# Patient Record
Sex: MALE | Race: WHITE | Employment: FULL TIME | URBAN - METROPOLITAN AREA
[De-identification: names, ages, dates, MRNs, and addresses within clinical notes are randomized per-mention and may not be internally consistent; named-entity substitution may affect disease eponyms.]

---

## 2025-05-16 ENCOUNTER — HOSPITAL ENCOUNTER (INPATIENT)
Age: 45
LOS: 3 days | Discharge: HOME | DRG: 897 | End: 2025-05-20
Admitting: PSYCHIATRY & NEUROLOGY
Payer: COMMERCIAL

## 2025-05-16 DIAGNOSIS — R45.851 SUICIDAL IDEATION: Primary | ICD-10-CM

## 2025-05-16 DIAGNOSIS — R45.851 DEPRESSION WITH SUICIDAL IDEATION: ICD-10-CM

## 2025-05-16 DIAGNOSIS — F32.A DEPRESSION WITH SUICIDAL IDEATION: ICD-10-CM

## 2025-05-16 LAB
AMPHET UR QL SCN: NORMAL
BARBITURATES UR QL SCN: NORMAL
BENZODIAZ UR QL SCN: NORMAL
BILIRUB UR QL STRIP: NEGATIVE
CANNABINOIDS UR QL SCN: NORMAL
CLARITY UR: CLEAR
COCAINE UR QL SCN: NORMAL
COLOR UR: YELLOW
DRUG SCREEN COMMENT UR-IMP: NORMAL
FENTANYL SCREEN, URINE: NORMAL
GLUCOSE UR STRIP-MCNC: NEGATIVE MG/DL
HGB UR QL STRIP: NEGATIVE
KETONES UR STRIP-MCNC: NEGATIVE MG/DL
LEUKOCYTE ESTERASE UR QL STRIP: NEGATIVE
METHADONE UR QL SCN: NORMAL
NITRITE UR QL STRIP: NEGATIVE
OPIATES UR QL SCN: NORMAL
OXYCODONE UR QL SCN: NORMAL
PCP UR QL SCN: NORMAL
PH UR STRIP: 5.5 [PH] (ref 5–9)
PROPOXYPH UR QL SCN: NORMAL
PROT UR STRIP-MCNC: NEGATIVE MG/DL
SP GR UR STRIP: 1.01 (ref 1–1.03)
URINE REFLEX TO CULTURE: NORMAL
UROBILINOGEN UR STRIP-ACNC: 0.2 E.U./DL

## 2025-05-16 PROCEDURE — 80143 DRUG ASSAY ACETAMINOPHEN: CPT

## 2025-05-16 PROCEDURE — 80179 DRUG ASSAY SALICYLATE: CPT

## 2025-05-16 PROCEDURE — 80053 COMPREHEN METABOLIC PANEL: CPT

## 2025-05-16 PROCEDURE — 80061 LIPID PANEL: CPT

## 2025-05-16 PROCEDURE — 82550 ASSAY OF CK (CPK): CPT

## 2025-05-16 PROCEDURE — 82077 ASSAY SPEC XCP UR&BREATH IA: CPT

## 2025-05-16 PROCEDURE — 81003 URINALYSIS AUTO W/O SCOPE: CPT

## 2025-05-16 PROCEDURE — 99284 EMERGENCY DEPT VISIT MOD MDM: CPT

## 2025-05-16 PROCEDURE — 85025 COMPLETE CBC W/AUTO DIFF WBC: CPT

## 2025-05-16 PROCEDURE — 36415 COLL VENOUS BLD VENIPUNCTURE: CPT

## 2025-05-16 PROCEDURE — 83036 HEMOGLOBIN GLYCOSYLATED A1C: CPT

## 2025-05-16 PROCEDURE — 84443 ASSAY THYROID STIM HORMONE: CPT

## 2025-05-16 PROCEDURE — 80307 DRUG TEST PRSMV CHEM ANLYZR: CPT

## 2025-05-16 ASSESSMENT — PAIN SCALES - GENERAL: PAINLEVEL_OUTOF10: 0

## 2025-05-16 ASSESSMENT — PAIN - FUNCTIONAL ASSESSMENT: PAIN_FUNCTIONAL_ASSESSMENT: 0-10

## 2025-05-17 PROBLEM — F32.A DEPRESSION, UNSPECIFIED: Status: ACTIVE | Noted: 2025-05-17

## 2025-05-17 LAB
ALBUMIN SERPL-MCNC: 4.6 G/DL (ref 3.5–4.6)
ALP SERPL-CCNC: 83 U/L (ref 35–104)
ALT SERPL-CCNC: 66 U/L (ref 0–41)
ANION GAP SERPL CALCULATED.3IONS-SCNC: 13 MEQ/L (ref 9–15)
APAP SERPL-MCNC: <5 UG/ML (ref 10–30)
AST SERPL-CCNC: 60 U/L (ref 0–40)
BASOPHILS # BLD: 0 K/UL (ref 0–0.2)
BASOPHILS NFR BLD: 0.9 %
BILIRUB SERPL-MCNC: 0.3 MG/DL (ref 0.2–0.7)
BUN SERPL-MCNC: 6 MG/DL (ref 6–20)
CALCIUM SERPL-MCNC: 8.7 MG/DL (ref 8.5–9.9)
CHLORIDE SERPL-SCNC: 100 MEQ/L (ref 95–107)
CHOLEST SERPL-MCNC: 193 MG/DL (ref 0–199)
CK SERPL-CCNC: 90 U/L (ref 0–190)
CO2 SERPL-SCNC: 24 MEQ/L (ref 20–31)
CREAT SERPL-MCNC: 0.59 MG/DL (ref 0.7–1.2)
EOSINOPHIL # BLD: 0.5 K/UL (ref 0–0.7)
EOSINOPHIL NFR BLD: 10.6 %
ERYTHROCYTE [DISTWIDTH] IN BLOOD BY AUTOMATED COUNT: 11.3 % (ref 11.5–14.5)
ESTIMATED AVERAGE GLUCOSE: 120 MG/DL
ETHANOL PERCENT: 0.22 G/DL
ETHANOLAMINE SERPL-MCNC: 248 MG/DL (ref 0–0.08)
GLOBULIN SER CALC-MCNC: 3.3 G/DL (ref 2.3–3.5)
GLUCOSE SERPL-MCNC: 96 MG/DL (ref 70–99)
HBA1C MFR BLD: 5.8 % (ref 4–6)
HCT VFR BLD AUTO: 45.1 % (ref 42–52)
HDLC SERPL-MCNC: 95 MG/DL (ref 40–59)
HGB BLD-MCNC: 15.8 G/DL (ref 14–18)
LDLC SERPL CALC-MCNC: 85 MG/DL (ref 0–129)
LYMPHOCYTES # BLD: 1.7 K/UL (ref 1–4.8)
LYMPHOCYTES NFR BLD: 39.3 %
MCH RBC QN AUTO: 32.1 PG (ref 27–31.3)
MCHC RBC AUTO-ENTMCNC: 35 % (ref 33–37)
MCV RBC AUTO: 91.7 FL (ref 79–92.2)
MONOCYTES # BLD: 0.5 K/UL (ref 0.2–0.8)
MONOCYTES NFR BLD: 10.3 %
NEUTROPHILS # BLD: 1.7 K/UL (ref 1.4–6.5)
NEUTS SEG NFR BLD: 38.2 %
PLATELET # BLD AUTO: 225 K/UL (ref 130–400)
POTASSIUM SERPL-SCNC: 4.3 MEQ/L (ref 3.4–4.9)
PROT SERPL-MCNC: 7.9 G/DL (ref 6.3–8)
RBC # BLD AUTO: 4.92 M/UL (ref 4.7–6.1)
SALICYLATES SERPL-MCNC: <0.3 MG/DL (ref 15–30)
SODIUM SERPL-SCNC: 137 MEQ/L (ref 135–144)
TRIGL SERPL-MCNC: 64 MG/DL (ref 0–150)
TSH SERPL-MCNC: 3.27 UIU/ML (ref 0.44–3.86)
WBC # BLD AUTO: 4.4 K/UL (ref 4.8–10.8)

## 2025-05-17 PROCEDURE — 93005 ELECTROCARDIOGRAM TRACING: CPT | Performed by: STUDENT IN AN ORGANIZED HEALTH CARE EDUCATION/TRAINING PROGRAM

## 2025-05-17 PROCEDURE — GZHZZZZ GROUP PSYCHOTHERAPY: ICD-10-PCS | Performed by: PSYCHIATRY & NEUROLOGY

## 2025-05-17 PROCEDURE — 1240000000 HC EMOTIONAL WELLNESS R&B

## 2025-05-17 PROCEDURE — 6370000000 HC RX 637 (ALT 250 FOR IP): Performed by: PSYCHIATRY & NEUROLOGY

## 2025-05-17 RX ORDER — HYDROXYZINE PAMOATE 50 MG/1
50 CAPSULE ORAL EVERY 6 HOURS PRN
Status: DISCONTINUED | OUTPATIENT
Start: 2025-05-17 | End: 2025-05-20 | Stop reason: HOSPADM

## 2025-05-17 RX ORDER — HALOPERIDOL 5 MG/ML
5 INJECTION INTRAMUSCULAR EVERY 6 HOURS PRN
Status: DISCONTINUED | OUTPATIENT
Start: 2025-05-17 | End: 2025-05-20 | Stop reason: HOSPADM

## 2025-05-17 RX ORDER — TRAZODONE HYDROCHLORIDE 50 MG/1
50 TABLET ORAL NIGHTLY PRN
Status: DISCONTINUED | OUTPATIENT
Start: 2025-05-17 | End: 2025-05-20 | Stop reason: HOSPADM

## 2025-05-17 RX ORDER — AMLODIPINE BESYLATE 5 MG/1
5 TABLET ORAL DAILY
COMMUNITY
Start: 2025-03-28 | End: 2026-03-28

## 2025-05-17 RX ORDER — HALOPERIDOL 5 MG/1
5 TABLET ORAL EVERY 6 HOURS PRN
Status: DISCONTINUED | OUTPATIENT
Start: 2025-05-17 | End: 2025-05-20 | Stop reason: HOSPADM

## 2025-05-17 RX ORDER — MAGNESIUM HYDROXIDE/ALUMINUM HYDROXICE/SIMETHICONE 120; 1200; 1200 MG/30ML; MG/30ML; MG/30ML
30 SUSPENSION ORAL PRN
Status: DISCONTINUED | OUTPATIENT
Start: 2025-05-17 | End: 2025-05-20 | Stop reason: HOSPADM

## 2025-05-17 RX ORDER — PROPRANOLOL HYDROCHLORIDE 80 MG/1
80 CAPSULE, EXTENDED RELEASE ORAL DAILY
Status: DISCONTINUED | OUTPATIENT
Start: 2025-05-17 | End: 2025-05-20 | Stop reason: HOSPADM

## 2025-05-17 RX ORDER — PROPRANOLOL HYDROCHLORIDE 80 MG/1
80 CAPSULE, EXTENDED RELEASE ORAL DAILY
COMMUNITY
Start: 2025-03-28 | End: 2026-03-28

## 2025-05-17 RX ORDER — LORAZEPAM 1 MG/1
1 TABLET ORAL EVERY 4 HOURS PRN
Status: DISCONTINUED | OUTPATIENT
Start: 2025-05-17 | End: 2025-05-20 | Stop reason: HOSPADM

## 2025-05-17 RX ORDER — LORAZEPAM 2 MG/ML
4 INJECTION INTRAMUSCULAR
Status: DISCONTINUED | OUTPATIENT
Start: 2025-05-17 | End: 2025-05-20 | Stop reason: HOSPADM

## 2025-05-17 RX ORDER — ACETAMINOPHEN 325 MG/1
650 TABLET ORAL EVERY 4 HOURS PRN
Status: DISCONTINUED | OUTPATIENT
Start: 2025-05-17 | End: 2025-05-20 | Stop reason: HOSPADM

## 2025-05-17 RX ORDER — LORAZEPAM 0.5 MG/1
0.5 TABLET ORAL EVERY 4 HOURS PRN
Status: DISCONTINUED | OUTPATIENT
Start: 2025-05-17 | End: 2025-05-20 | Stop reason: HOSPADM

## 2025-05-17 RX ORDER — HYDROXYZINE HYDROCHLORIDE 50 MG/ML
50 INJECTION, SOLUTION INTRAMUSCULAR EVERY 6 HOURS PRN
Status: DISCONTINUED | OUTPATIENT
Start: 2025-05-17 | End: 2025-05-20 | Stop reason: HOSPADM

## 2025-05-17 RX ORDER — LORAZEPAM 2 MG/1
2 TABLET ORAL
Status: DISCONTINUED | OUTPATIENT
Start: 2025-05-17 | End: 2025-05-20 | Stop reason: HOSPADM

## 2025-05-17 RX ORDER — AMLODIPINE BESYLATE 5 MG/1
5 TABLET ORAL DAILY
Status: DISCONTINUED | OUTPATIENT
Start: 2025-05-17 | End: 2025-05-20 | Stop reason: HOSPADM

## 2025-05-17 RX ORDER — BENZTROPINE MESYLATE 1 MG/ML
2 INJECTION, SOLUTION INTRAMUSCULAR; INTRAVENOUS 2 TIMES DAILY PRN
Status: DISCONTINUED | OUTPATIENT
Start: 2025-05-17 | End: 2025-05-20 | Stop reason: HOSPADM

## 2025-05-17 RX ADMIN — AMLODIPINE BESYLATE 5 MG: 5 TABLET ORAL at 14:14

## 2025-05-17 RX ADMIN — PROPRANOLOL HYDROCHLORIDE 80 MG: 80 CAPSULE, EXTENDED RELEASE ORAL at 17:17

## 2025-05-17 ASSESSMENT — LIFESTYLE VARIABLES
HOW MANY STANDARD DRINKS CONTAINING ALCOHOL DO YOU HAVE ON A TYPICAL DAY: 5 OR 6
HOW OFTEN DO YOU HAVE A DRINK CONTAINING ALCOHOL: 4 OR MORE TIMES A WEEK

## 2025-05-17 ASSESSMENT — SLEEP AND FATIGUE QUESTIONNAIRES
AVERAGE NUMBER OF SLEEP HOURS: 6
DO YOU USE A SLEEP AID: NO
DO YOU HAVE DIFFICULTY SLEEPING: NO

## 2025-05-17 NOTE — VIRTUAL HEALTH
Telepsych obtained collateral from pt's wife Maria Luz ([phone]). She reported that their neighbors were over last night and patient got extremely drunk. Maria Luz reported that she told patient that she wasn't happy he was so drunk and he went to the garage, got his gun and stated that he was going to shoot himself. She also reported that he was sending \"goodbye texts\" to friends and family. Maria Luz stated that patient has never threatened suicide before and he does not have a mental health history. She reported that patient drinks a lot of alcohol daily and they are on the verge of divorce. Maria Luz stated that she does not know where the gun is currently.     Due to patient allegedly threatening to shoot himself with his gun, RIGOBERTO is recommending inpatient psychiatric admission. RIGOBERTO discussed collateral information and recommendations with ED Physician.    Plan: Admit patient to inpatient psychiatric unit.     Electronically signed by RIGOBERTO Pickering on 5/17/2025 at 6:33 AM

## 2025-05-17 NOTE — PROGRESS NOTES
Pt arrived to the unit via [facility] and ambulated with a strong and steady gait to assigned room. Skin and contraband check complete with Fer GRAHAM. Skin intact and no contraband found. Pt provided with toiletries and water pitcher. Given a tour of the unit. Pt completed OQ.

## 2025-05-17 NOTE — ED NOTES
Assumed care of patient. Patient sitting up in bed, calm and cooperative. Patient asked about updated plan of care: pending telehealth.

## 2025-05-17 NOTE — VIRTUAL HEALTH
Received request for Telepsychiatry evaluation.     Patient is too intoxicated at this time to complete full evaluation, will plan to re-evaluate in 1-2 hours. Please notify telepsych team in the interim for any new changes or concerns. RIGOBERTO notified HALEY Malin of the reason for delay.      --RIGOBERTO Pickering on 5/17/2025 at 2:45 AM    An electronic signature was used to authenticate this note.

## 2025-05-17 NOTE — ED NOTES
Patient came in for pre employment drug screen.   Reviewed pt with Dr. Mei. Received order to admit to 3W.

## 2025-05-17 NOTE — ED PROVIDER NOTES
acute distress.     Appearance: He is not diaphoretic.   HENT:      Head: Normocephalic and atraumatic.      Mouth/Throat:      Mouth: Mucous membranes are moist.   Eyes:      Conjunctiva/sclera: Conjunctivae normal.   Cardiovascular:      Rate and Rhythm: Normal rate.   Pulmonary:      Effort: Pulmonary effort is normal.   Musculoskeletal:         General: Normal range of motion.   Skin:     General: Skin is warm and dry.   Neurological:      Mental Status: He is alert and oriented to person, place, and time.   Psychiatric:         Mood and Affect: Mood is depressed.         Thought Content: Thought content is not paranoid or delusional. Thought content includes suicidal ideation. Thought content does not include homicidal ideation. Thought content does not include suicidal plan.         DIAGNOSTIC RESULTS     EKG: All EKG's are interpreted by the Emergency Department Physician who either signs or Co-signs this chart in the absence of a cardiologist.        RADIOLOGY:   Non-plain film images such as CT, Ultrasound and MRI are read by the radiologist. Plain radiographic images are visualized and preliminarily interpreted by the emergency physician with the below findings:        Interpretation per the Radiologist below, if available at the time of this note:    No orders to display         ED BEDSIDE ULTRASOUND:   Performed by ED Physician - none    LABS:  Labs Reviewed   ACETAMINOPHEN LEVEL - Abnormal; Notable for the following components:       Result Value    Acetaminophen Level <5 (*)     All other components within normal limits   CBC WITH AUTO DIFFERENTIAL - Abnormal; Notable for the following components:    WBC 4.4 (*)     MCH 32.1 (*)     RDW 11.3 (*)     All other components within normal limits   COMPREHENSIVE METABOLIC PANEL - Abnormal; Notable for the following components:    Creatinine 0.59 (*)     ALT 66 (*)     AST 60 (*)     All other components within normal limits   LIPID PANEL - Abnormal; Notable

## 2025-05-17 NOTE — VIRTUAL HEALTH
Sheldon Avalos  [id number]  1980     Social Work Behavioral Health Crisis Assessment    05/17/25    Chief Complaint: \"I drank too much\"     HPI: Patient is a 45 y.o. White (non-) male who presents for suicidal ideation. It is documented in RN and ED provider's notes that patient reported he was suicidal without a plan.     LISW met with patient and completed evaluation via ePAR. Patient was alert/oriented, calm/cooperative, had good eye contact, normal speech, linear thought process, patient denied SI/HI/hallucinations. Patient reported that he is in the ER because he drank too much and his neighbor's called the Police with concerns that he was going to hurt himself. Patient stated that his neighbor's were over his house visiting and he does not know why they were concerned. Patient adamantly denied hx of suicidal ideation and does not remember making suicidal statements to anyone, including ED staff. He denied HI and hx of violence. Patient denied hallucinations and psychotic symptoms. He denied mental health hx and hx of mental health treatment. Patient reported that he drinks approximately 6-8 beers daily and he verbalized that his alcohol use is a problem. He reported that he lives with his wife and children and has good support from his wife and family. Patient stated that he owns a firearm. He was future oriented and participated in safety planning.     Collateral: Patient gave JAMISONW verbal permission to contact his wife Maria Luz ([phone]) several times for collateral information, no answer, voicemails left.     Past Psychiatric History:  Previous Diagnoses/symptoms: Denies  Previous suicide attempts/self-harm: Denies  Inpatient psychiatric hospitalizations: denies  Current outpatient psychiatric provider: Denies  Current therapist: States not in therapy  Previous psychiatric medication trials: No prior medication trials  Current psychiatric medications: No current psychiatric

## 2025-05-18 PROCEDURE — 1240000000 HC EMOTIONAL WELLNESS R&B

## 2025-05-18 PROCEDURE — 6370000000 HC RX 637 (ALT 250 FOR IP): Performed by: PSYCHIATRY & NEUROLOGY

## 2025-05-18 RX ADMIN — AMLODIPINE BESYLATE 5 MG: 5 TABLET ORAL at 08:28

## 2025-05-18 RX ADMIN — PROPRANOLOL HYDROCHLORIDE 80 MG: 80 CAPSULE, EXTENDED RELEASE ORAL at 08:28

## 2025-05-18 ASSESSMENT — PATIENT HEALTH QUESTIONNAIRE - PHQ9
2. FEELING DOWN, DEPRESSED OR HOPELESS: NOT AT ALL
SUM OF ALL RESPONSES TO PHQ QUESTIONS 1-9: 0
1. LITTLE INTEREST OR PLEASURE IN DOING THINGS: NOT AT ALL
SUM OF ALL RESPONSES TO PHQ QUESTIONS 1-9: 0

## 2025-05-18 ASSESSMENT — LIFESTYLE VARIABLES
HOW OFTEN DO YOU HAVE A DRINK CONTAINING ALCOHOL: 4 OR MORE TIMES A WEEK
HOW MANY STANDARD DRINKS CONTAINING ALCOHOL DO YOU HAVE ON A TYPICAL DAY: 5 OR 6

## 2025-05-18 NOTE — CARE COORDINATION
Psychosocial Assessment     Admission Reason: Client has been admitted to [facility] for psychiatric evaluation after SI with a firearm while intoxicated     C-SSRS Lifetime Recent Completed - Current Suicide Risk:   [] No Risk  [] Low [] Moderate [x] High     Risk Factors:   Past SI,. Alcoholism     Protective Factors: Supportive family, Desire to get better       Gender:  [x] Male [] Female [] Transgender  [] Other    Sexual Orientation:  [x] Heterosexual [] Homosexual [] Bisexual [] Other    Current or Past Mental Health and/or Addictions Treatment (and response to treatment):  [] Yes, When and Where:   [x] No    Substance Use/Alcohol Use/Addiction (document name of substance, age of onset, how much and how often, route of use and date of last use):  [x] Reports   Substance: Alcohol   Age of Onset:  Teenage   How Much and how often: Daily 5 - 6 drinks (DINA reports 10 - 12)   Last Usage: Day of admission     [x] Patient was provided a listing of community sobriety resources on admission.    [] Denies    AUDIT:10  DAST:0  PHQ 9:0    Sobriety Education provided:  [x] Yes  [] No  [] N/A [] Refused    Learners: Patient [x]  Family []  Significant Other  [] Caregiver [] Other []   Readiness: Eager []   Acceptance  [x]   Nonacceptances []   Refused []   Method: Explanation [x]   Handout []   Response: Verbalizes Understanding [x]   No evidence of Learning []   Refuses []     Referral made to [facility] for sobriety services and support  [x] Yes       Date: 05/18/2025               [] No    Family History of Mental Illness or Substance Use/Abuse:   [] Yes (Specify)    [x] No    Trauma and Abuse History:   [] Reports   ( Physical []  Verbal []  Emotional []  Financial []   Sexual [] )  [x] Denies      Legal History:  []  Yes (Specify)    [x] No     Involvement:  [] Yes (Specify)    [x] No     Employment and/or Benefits:    [] Yes (Specify)   SSD []  SSI []   SNAP[] Medicaid[]  [x] No      Leisure & Recreational

## 2025-05-18 NOTE — CONSULTS
Consult      Patient:  Sheldon Avalos  YOB: 1980    MRN: [id number]     Acct: [id number]    Primary Care Physician: No primary care provider on file.    HISTORY OF PRESENT ILLNESS:   History obtained from chart review and the patient.    The patient is a 45 y.o. male whom I have been requested to see by Dr. Zacarias for evaluation of medical management. Patient was admitted over night due to severe depression/suicidal ideation. Denied fever/dyspnea/CP. Compliant with his medications at home     Past Medical History:  No past medical history on file.    Past Surgical History:  No past surgical history on file.    Medications:    No current facility-administered medications on file prior to encounter.     Current Outpatient Medications on File Prior to Encounter   Medication Sig Dispense Refill    amLODIPine (NORVASC) 5 MG tablet Take 1 tablet by mouth daily      propranolol (INDERAL LA) 80 MG extended release capsule Take 1 capsule by mouth daily         Allergies:  Patient has no known allergies.    Social History:        Occupation:     Family History:   No family history on file.    Review of systems:  Constitutional: no fever, no night sweats, no fatigue  Head: no headache, no head injury, no migranes.  Eye: no blurring of vision, no double vision.  Ears: no hearing difficulty, no tinnitus  Mouth/throat: no ulceration, dental caries, dysphagia  Lungs: no cough, no shortness of breath, no wheeze  CVS: no palpitation, no chest pain, no shortness of breath  GI: no abdominal pain, no nausea , no vomiting, no constipation  MODE: no dysuria, frequency and urgency, no hematuria, no kidney stones  Musculoskeletal: no joint pain, swelling , stiffness  Endocrine: no polyuria, polydypsia, no cold or heat intolerence  Hematology: no anemia, no easy brusing or bleeding, no hx of clotting disorder  Dermatology: no skin rash, no eczema, no prurities,  Psychiatry: positive for  depression,   anxiety,

## 2025-05-18 NOTE — H&P
hallucinations. He denied feeling paranoid and did not endorse other delusions.        Stressors: ongoing alcohol abuse    The patient is not currently receiving care for the above psychiatric illness.    Medications Prior to Admission:   Medications Prior to Admission: amLODIPine (NORVASC) 5 MG tablet, Take 1 tablet by mouth daily  propranolol (INDERAL LA) 80 MG extended release capsule, Take 1 capsule by mouth daily      Psychiatric Review of Systems       Depression: denies     S[place]nee or Hypomania:  no     Panic Attacks:  no     Phobias:  no     Obsessions and Compulsions:  no     PTSD : no     Hallucinations:  no     Delusions:  no    Substance Abuse History:  ETOH: yes, as above, high quantity   Marijuana: denies  Opiates: denies  Other Drugs: denies      Past Psychiatric History:  Prior Diagnosis:  none  Psychiatrist: no  Therapist:no  Hospitalization: no  Hx of Suicidal Attempts: no  Hx of violence:  no  ECT: no  Previous discontinued Psychiatric Med Trials: n/a    Past Medical History:    No past medical history on file.    Past Surgical History:    No past surgical history on file.    Allergies:   Patient has no known allergies.    Family History  No family history on file.      Social History:  Born and Raised: [place]  Describes Childhood:   supportive  Education: College  Employment: Employed full time  Relationships:   Children:  two children, ages 17 and 7  Current Support: romantic partner    Legal Hx:  past history of arrest for open container and speeding  Access to weapons?:  Yes      EXAMINATION:    REVIEW OF SYSTEMS:    ROS:  [x] All negative/unchanged except if checked. Explain positive(checked items) below:  [] Constitutional  [] Eyes  [] Ear/Nose/Mouth/Throat  [] Respiratory  [] CV  [] GI  []   [] Musculoskeletal  [] Skin/Breast  [] Neurological  [] Endocrine  [] Heme/Lymph  [] Allergic/Immunologic    Explanation:     Vitals:  BP (!) 123/95   Pulse 72   Temp 97.5 °F (36.4 °C)   Resp

## 2025-05-18 NOTE — FLOWSHEET NOTE
Pt has been visible out on the unit walking and stays to self.  Pt did meet with [facility] this afternoon. Pt rates his depression a 1/10 on a scale of \"1-10\" with ten being the highest. Pt denies anxiety. No S/S of W/D noted at this time. Pt denies W/D symptoms. Pt encouraged to attend groups and other milieu activities. Pt denies SI/HI/AVH.

## 2025-05-18 NOTE — GROUP NOTE
Group Therapy Note  Patient did not attend group.       Date: 5/18/2025    Group Start Time: 1200  Group End Time: 1300  Group Topic: Psychoeducation    [facility] 3[facility] Annette Egan    Group Therapy Note    End of week check-in     Description:   Patients will be given a paper to complete during this intervention. This template includes questions about patient's weeks: how were you feeling this week, what color would describe your week, etc. Patients will be encouraged to share their findings with peers/staff.     Goals:  Improve/Maintain Attention to Task, Improve/Maintain Cognitive Skills, Improve/Maintain Insight / Self-Awareness, Increase/Maintain Level of Socialization, Improve/Maintain Self-Expression, and Improve/Maintain Use of Coping Skills    Self Compassion Check In:     Description:   Patients will be given a sheet that has 5 prompts for them to complete. These prompts include thinking of a situation that they feel they are struggling with giving themselves compassion for, what they can do to comfort themselves emotionally, what they can do to protect themselves from others or themselves, what they think they need/how they can give themselves that need, and a motivational encouragement to continue working on their self-compassion. Patients will then be encouraged to speak about their findings and interact with the peers/staff.     Goals:   Improve/Maintain Attention to Task, Improve/Maintain Cognitive Skills, Improve/Maintain Insight / Self-Awareness, Increase/Maintain Level of Socialization, Improve/Maintain Self-Esteem, Improve/Maintain Self-Expression, and Improve/Maintain Use of Coping Skills           Patient did not attend group.     Signature: Annette Huang, Psychoeducational Specialist

## 2025-05-18 NOTE — CARE COORDINATION
Leisure Assessment  May 18, 2025 /  1045 am    Appearance: Alert, Appears as stated age, Cooperative, In no distress, and Pleasant  Current Mental Status: Insight into issues  Affect/Mood: Congruent/ Euthymic  Thought Content/Processes: Linear  Insight/Judgement: Fair insight and Fair judgment  Speech: normal rate and normal volume  Delusions/Hallucinations: no evidence of delusions /  none observed/reported :   Admit Status: Involuntary Commitment    Pt reported that he typically spends his time with others working on cars or playing video games. Pt reported that he has a healthy support system, does not have issues with irrational anger with himself or others, internalizes his feelings, likes himself, success/recovery is possible, and he is in control of his life. Pt reported that his strength is that \"he won't give up.\" Pt reported that he does not face any limitations in life. Pt reported that he was hospitalized d/t \"too much drinking.\" Pt reported goal is \"to stop drinking.\"     Recommendations: Decrease Impulsivity/Impulsive Behaviors, Improve/Maintain Coping Skills Development, and Improve/Maintain Expressive Communication/Self-Expression    Signature: Annette Huang, Psychoeducational Specialist

## 2025-05-18 NOTE — CARE COORDINATION
FAMILY COLLATERAL NOTE    Family/Support Name: Maria Luz   Contact #: [phone]  Relationship to Pt:: Wife         Family/Support contact aware of hospitalization: Yes   Presenting Symptoms/Current Concerns:   Reports \"He is a big drinker - about 10 - 12 drinks daily until he passes out. He as been having mood swings and has been angry. He has been having SI in the past and has threatened suicide while drunk before - never sought treatment. This is the first time he has threatened with a firearm. Could have been triggered by Mother's day. Hs not spoken to his mother in fourteen (14) years.\"       Top 3 Life Stressors:   Employment stress   Family Stress (has not spoken to mother in fourteen (14) years   Marital strain / stress related to alcoholic       Background History Relevant to Current Hospitalization:    Threatened suicide with a firearm while highly intoxicated.       Family Mental Health/Substance Use History:     None     Support Network's Goal for Hospitalization:  Yes     Discharge Plan:  D/C Home, F/U [facility] on Unit, Follow up Angela and associates in community       Support Network Supportive of Discharge Plan: Yes       Support can confirm Safety of Location and Security of Weapons: Secured by wife       Support agreeable to Safeguard and Monitor Medications (including Prescription and OTC):  As needed     Identified Barriers to Compliance with Discharge Plan:  none     Recommendations for Support Network: LINDSAY Manzano

## 2025-05-19 LAB
EKG ATRIAL RATE: 81 BPM
EKG DIAGNOSIS: NORMAL
EKG P AXIS: 78 DEGREES
EKG P-R INTERVAL: 146 MS
EKG Q-T INTERVAL: 416 MS
EKG QRS DURATION: 94 MS
EKG QTC CALCULATION (BAZETT): 483 MS
EKG R AXIS: -51 DEGREES
EKG T AXIS: 71 DEGREES
EKG VENTRICULAR RATE: 81 BPM

## 2025-05-19 PROCEDURE — 6370000000 HC RX 637 (ALT 250 FOR IP): Performed by: PSYCHIATRY & NEUROLOGY

## 2025-05-19 PROCEDURE — 1240000000 HC EMOTIONAL WELLNESS R&B

## 2025-05-19 PROCEDURE — 90833 PSYTX W PT W E/M 30 MIN: CPT | Performed by: PSYCHIATRY & NEUROLOGY

## 2025-05-19 PROCEDURE — 99232 SBSQ HOSP IP/OBS MODERATE 35: CPT | Performed by: PSYCHIATRY & NEUROLOGY

## 2025-05-19 RX ADMIN — AMLODIPINE BESYLATE 5 MG: 5 TABLET ORAL at 08:23

## 2025-05-19 RX ADMIN — PROPRANOLOL HYDROCHLORIDE 80 MG: 80 CAPSULE, EXTENDED RELEASE ORAL at 08:24

## 2025-05-19 NOTE — GROUP NOTE
Patient did not attend group.    Date: 5/19/2025    Group Start Time: 1230  Group End Time: 1325  Group Topic: Music Therapy    [facility] 3W Bella Ann    Live Music Group  Music Listening, Re-creative Singing    Patients will be given a songbook and offered the opportunity to select (a) song(s) of their choice for live music listening on [employer]. Patients will be encouraged to sing along, move along, and listen to the music.    Focus: Building Positive Experiences and Relaxation   Goals: Increase/Maintain Level of Socialization, Improve Mood, Improve/Maintain Self-Esteem, Improve/Maintain Self-Expression, Improve/Maintain Use of Coping Skills, and Promote Reality Orientation     Signature: Bella Lizarraga, Licensed Professional Music Therapist (LPMT)

## 2025-05-19 NOTE — PROGRESS NOTES
[facility]  BEHAVIORAL HEALTH FOLLOW-UP NOTE       5/19/2025     Patient was seen and examined in person, Chart reviewed   Patient's case discussed with staff/team    Chief Complaint: Depression SI    Interim History:     Pt report starting to feel better  Pt want to get into IOP program as OP - for his addiction  Pt has been feeling depressed for 6 months  Marital discord, 2 kids - 17 and 7 year old  Pt work as a   Not going through withdrawal  Not recall the text message, but remember threatening suicide with a gun that he had access  GUN has been secured  Offered anti craving medication - which he declined  Appetite:   [] Normal/Unchanged  [] Increased  [x] Decreased      Sleep:       [] Normal/Unchanged  [x] Fair       [] Poor              Energy:    [] Normal/Unchanged  [] Increased  [] Decreased        SI [] Present  [x] Absent    HI  []Present  [x] Absent     Aggression:  [] yes  [x] no    Patient is [x] able  [] unable to CONTRACT FOR SAFETY     PAST MEDICAL/PSYCHIATRIC HISTORY:   No past medical history on file.    FAMILY/SOCIAL HISTORY:  No family history on file.  Social History     Socioeconomic History    Marital status:      Spouse name: Not on file    Number of children: Not on file    Years of education: Not on file    Highest education level: Not on file   Occupational History    Not on file   Tobacco Use    Smoking status: Not on file    Smokeless tobacco: Not on file   Substance and Sexual Activity    Alcohol use: Not on file    Drug use: Not on file    Sexual activity: Not on file   Other Topics Concern    Not on file   Social History Narrative    Not on file     Social Drivers of Health     Financial Resource Strain: Low Risk  (3/28/2025)    Received from [facility]    Overall Financial Resource Strain (CARDIA)     Difficulty of Paying Living Expenses: Not hard at all   Food Insecurity: No Food Insecurity (5/18/2025)    Hunger Vital Sign

## 2025-05-19 NOTE — GROUP NOTE
Group Therapy Note    Date: 5/19/2025    Group Start Time: 1015  Group End Time: 1100  Group Topic: Art Therapy     [facility] 3W Saray Arteaga, JAMISONW        Group Therapy Note    Attendees: 10       Patient's Goal:  To participate in morning group art therapy.     Notes:  Patient did not attend.     Modes of Intervention: Activity      Discipline Responsible: Psychoeducational Specialist      Signature:  Saray Lal MA [facility]TR [facility]

## 2025-05-19 NOTE — CARE COORDINATION
Client met with [facility] on 05/18/2025 -Rafaela RO     \"CPRS gave CLT. Info. On Psych & Psych. Wants IOP, CPRS to F/U with phone call. \"

## 2025-05-19 NOTE — PROGRESS NOTES
Progress Note    Date:5/19/2025       [facility]  Patient Name:Sheldon Avalos     YOB: 1980     Age:45 y.o.    Assessment        Hospital Problems           Last Modified POA    * (Principal) Depression, unspecified 5/17/2025 Yes       Plan:      *Severe depression  - Psychiatry managing    *Hypertension  - Amlodipine 5 mg daily and propranolol 80 mg daily    *Alcohol use  - CIWA precautions with Ativan    *Abnormal LFTs; ALT 66 AST 60; expected to trend down  - Monitor hepatic panel    Additional work up or/and treatment plan may be added today or then after based on clinical progression by other providers or specialists.  Patient will need to follow-up with PCP for chronic disease management.     I have spent greater than 70% of time  spent focused exclusively on this patient ,reviewing  chart, labs/diagnostics, reconciling medications, &  answering questions with patient and discussing plan.    Subjective   Interval History Status: Patient reevaluated for noted elevation in blood pressure, and alcohol withdrawal symptoms .currently on CIWA precautions with Ativan.  He denies alcohol withdrawal symptoms blood pressure elevated but within acceptable range.  Currently on amlodipine 5 mg and propranolol 80 mg daily patient denies chest pain, shortness of breath, palpitations, headache, or dizziness.  Review of Systems   12 point review of systems reviewed with patient; negative other than as mentioned    Medications   Scheduled Meds:    amLODIPine  5 mg Oral Daily    propranolol  80 mg Oral Daily     Continuous Infusions:   PRN Meds: acetaminophen, magnesium hydroxide, aluminum & magnesium hydroxide-simethicone, haloperidol **OR** haloperidol lactate, benztropine mesylate, traZODone, hydrOXYzine pamoate **OR** hydrOXYzine, LORazepam **OR** LORazepam **OR** LORazepam **OR** LORazepam **OR** LORazepam    Past History    Past Medical History:   has no past medical history on file.    Social

## 2025-05-19 NOTE — GROUP NOTE
Patient did not attend group.    Date: 5/19/2025    Group Start Time: 0920  Group End Time: 1005  Group Topic: Psychoeducation    [facility] 3W Bella Ann    Self-Exploration  Arabella Analysis/Discussion, Receptive Music Listening    Patients will listen to \"Lemon Tree\" by Post Phan and identify lyrics, themes, and interpretations derived from the song. Patients will discuss topics related to personal growth, self-exploration, and how to promote resiliency.    Focus: Future-Oriented Thinking, Processing  Goals: Improve/Maintain Cognitive Skills, Improve/Maintain Identity Development, Improve/Maintain Insight / Self-Awareness, Improve/Maintain Self-Expression, Improve/Maintain Use of Coping Skills, and Promote Reality Orientation    Signature: Bella Lizarraga, Licensed Professional Music Therapist (LPMT)

## 2025-05-20 VITALS
RESPIRATION RATE: 18 BRPM | DIASTOLIC BLOOD PRESSURE: 86 MMHG | BODY MASS INDEX: 16.66 KG/M2 | HEIGHT: 71 IN | SYSTOLIC BLOOD PRESSURE: 116 MMHG | TEMPERATURE: 97.1 F | OXYGEN SATURATION: 100 % | HEART RATE: 72 BPM | WEIGHT: 119 LBS

## 2025-05-20 PROBLEM — F19.94 SUBSTANCE INDUCED MOOD DISORDER (HCC): Status: ACTIVE | Noted: 2025-05-17

## 2025-05-20 PROBLEM — F10.90 ALCOHOL USE DISORDER: Status: ACTIVE | Noted: 2025-05-20

## 2025-05-20 PROCEDURE — 6370000000 HC RX 637 (ALT 250 FOR IP): Performed by: PSYCHIATRY & NEUROLOGY

## 2025-05-20 RX ADMIN — AMLODIPINE BESYLATE 5 MG: 5 TABLET ORAL at 08:31

## 2025-05-20 RX ADMIN — PROPRANOLOL HYDROCHLORIDE 80 MG: 80 CAPSULE, EXTENDED RELEASE ORAL at 08:31

## 2025-05-20 NOTE — DISCHARGE INSTR - DIET

## 2025-05-20 NOTE — DISCHARGE SUMMARY
DISCHARGE SUMMARY      Patient ID:  Sheldon Avalos  [id number]  45 y.o.  1980      Admit date: 5/16/2025    Discharge date and time: 5/20/2025    Admitting Physician: Santiago Mei MD     Discharge Physician: Dr Rell GASTON    Admission Diagnoses: Suicidal ideation [R45.851]  Depression with suicidal ideation [F32.A, R45.851]  Depression, unspecified [F32.A]    Admission Condition: poor    Discharged Condition: stable    Admission Circumstance:     Mr. Sheldon Avalos is a 45 y.o. male with no known history of mental health issues, but a history of alcohol use, who was brought to the ER for suicidal ideation/statements and suicidal gestures. Per ER  Behavioral Health Crisis Assessment notes, \"Patient reported that he is in the ER because he drank too much and his neighbor's called the Police with concerns that he was going to hurt himself. Patient stated that his neighbor's were over his house visiting and he does not know why they were concerned. Patient adamantly denied hx of suicidal ideation and does not remember making suicidal statements to anyone, including ED staff. He denied HI and hx of violence. Patient denied hallucinations and psychotic symptoms. He denied mental health hx and hx of mental health treatment. Patient reported that he drinks approximately 6-8 beers daily and he verbalized that his alcohol use is a problem. He reported that he lives with his wife and children and has good support from his wife and family. Patient stated that he owns a firearm.\" And \"[employer] obtained collateral from pt's wife Maria Luz ([phone]). She reported that their neighbors were over last night and patient got extremely drunk. Maria Luz reported that she told patient that she wasn't happy he was so drunk and he went to the garage, got his gun and stated that he was going to shoot himself. She also reported that he was sending \"goodbye texts\" to friends and family. Maria Luz stated that patient has never threatened

## 2025-05-20 NOTE — PLAN OF CARE
Admission complete. Pt is calm and cooperative. Linear thought process and appropriate eye contact. Pt states he was drinking in his backyard when he made a joke about killing himself. Reports His neighbors and wife must have taken him seriously because someone called the police. Pt states, \"The  said I didn't even seem that drunk and I wasn't.\" Pt appears to be minimizing mental health symptoms. He denies SI and reports he has no past attempts. Pt does have access to one firearm at his home. Denies HI. Denies AVH. Pt states he does not want to be put on any psychiatric medication because he does not feel like there is anything wrong. Pt states he drinks 4 or more times/week and drinks approx 5-6 beers in one sitting. Per collateral from wife, pt is a heavy and daily drinker. Pt refusing LGR at this time. States on Friday he called a facility to assist him with sobriety but cannot elaborate on what kind or if it was inpatient or outpatient. Pt rates anxiety and depression both 1/10 with 10 being the worst. He lives with his wife and two kids. Pt states wife is supportive. Per collateral from wife, they are on the verge of divorce.  Pt works full time at [employer]. He denies any issues with sleep or appetite. Denies any signs or symptoms of withdrawal. Pt appears preoccupied with D/C.     Problem: Pain  Goal: Verbalizes/displays adequate comfort level or baseline comfort level  Outcome: Progressing     Problem: Anxiety  Goal: Will report anxiety at manageable levels  Description: INTERVENTIONS:1. Administer medication as ordered2. Teach and rehearse alternative coping skills3. Provide emotional support with 1:1 interaction with staff  Outcome: Progressing     Problem: Coping  Goal: Pt/Family able to verbalize concerns and demonstrate effective coping strategies  Description: INTERVENTIONS:1. Assist patient/family to identify coping skills, available support systems and cultural and spiritual values2. Provide 
Isolative to room and self. Appears flat, sad. Remains withdrawn, cooperative. Denies SI, HI, AVH. Denies anxiety and depression. States he is feeling tired. Provides brief answers to assessment questions. Encouraged to perform daily hygiene care and to attend groups. Denies symptoms of withdrawal and pain. Denies further needs at this time.         Problem: Pain  Goal: Verbalizes/displays adequate comfort level or baseline comfort level  5/18/2025 0019 by Paradise Carpenter RN  Outcome: Progressing  5/17/2025 1404 by Tia Gonzalez RN  Outcome: Progressing     Problem: Anxiety  Goal: Will report anxiety at manageable levels  Description: INTERVENTIONS:1. Administer medication as ordered2. Teach and rehearse alternative coping skills3. Provide emotional support with 1:1 interaction with staff  5/18/2025 0019 by Paradise Carpenter RN  Outcome: Progressing  5/17/2025 1404 by Tia Gonzalez RN  Outcome: Progressing     Problem: Coping  Goal: Pt/Family able to verbalize concerns and demonstrate effective coping strategies  Description: INTERVENTIONS:1. Assist patient/family to identify coping skills, available support systems and cultural and spiritual values2. Provide emotional support, including active listening and acknowledgement of concerns of patient and caregivers3. Reduce environmental stimuli, as able4. Instruct patient/family in relaxation techniques, as appropriate5. Assess for spiritual pain/suffering and initiate Spiritual Care, Psychosocial Clinical Specialist consults as needed  5/18/2025 0019 by Paradise Carpenter RN  Outcome: Progressing  5/17/2025 1404 by Tia Gonzalez RN  Outcome: Progressing     Problem: Behavior  Goal: Pt/Family maintain appropriate behavior and adhere to behavioral management agreement, if implemented  Description: INTERVENTIONS:1. Assess patient/family's coping skills and  non-compliant behavior (including use of illegal substances)2. Notify security of behavior or suspected 
Patient pleasant and cooperative. Discharge focused, states he was wanting to leave today. Reports his mood has improved. Quiet/withdrawn in conversation. Denies any thoughts of self harm/SI/HI/AVH. Social with select peers, out walking in hallway by self on and off. Reports good appetite and sleep.   Problem: Pain  Goal: Verbalizes/displays adequate comfort level or baseline comfort level  5/19/2025 1054 by Klaudia James RN  Outcome: Progressing  5/18/2025 2349 by Paradise Carpenter RN  Outcome: Progressing     Problem: Anxiety  Goal: Will report anxiety at manageable levels  Description: INTERVENTIONS:1. Administer medication as ordered2. Teach and rehearse alternative coping skills3. Provide emotional support with 1:1 interaction with staff  5/19/2025 1054 by Klaudia James RN  Outcome: Progressing  Flowsheets (Taken 5/19/2025 1051)  Will report anxiety at manageable levels: Provide emotional support with 1:1 interaction with staff  5/18/2025 2349 by Paradise Carpenter RN  Outcome: Progressing     Problem: Coping  Goal: Pt/Family able to verbalize concerns and demonstrate effective coping strategies  Description: INTERVENTIONS:1. Assist patient/family to identify coping skills, available support systems and cultural and spiritual values2. Provide emotional support, including active listening and acknowledgement of concerns of patient and caregivers3. Reduce environmental stimuli, as able4. Instruct patient/family in relaxation techniques, as appropriate5. Assess for spiritual pain/suffering and initiate Spiritual Care, Psychosocial Clinical Specialist consults as needed  5/19/2025 1054 by Klaudia James RN  Outcome: Progressing  Flowsheets (Taken 5/19/2025 1051)  Patient/family able to verbalize anxieties, fears, and concerns, and demonstrate effective coping: Reduce environmental stimuli, as able  5/18/2025 2349 by Paradise Carpenter RN  Outcome: Progressing     Problem: Behavior  Goal: Pt/Family maintain 
the need for search of the family and/or belongings3. Encourage verbalization of thoughts and concerns in a socially appropriate manner4. Utilize positive, consistent limit setting strategies supporting safety of patient, staff and others5. Encourage participation in the decision making process about the behavioral management agreement6. If a visitor's behavior poses a threat to safety call refer to organization policy.7. Initiate consult with , Psychosocial CNS, Spiritual Care as appropriate  5/19/2025 2325 by Cat Starks RN  Outcome: Progressing  5/19/2025 1054 by Klaudia James RN  Outcome: Progressing  Flowsheets (Taken 5/19/2025 1051)  Patient/family maintains appropriate behavior and adheres to behavioral management agreement, if implemented: Utilize positive, consistent limit setting strategies supporting safety of patient, staff and others     Problem: Depression/Self Harm  Goal: Effect of psychiatric condition will be minimized and patient will be protected from self harm  Description: INTERVENTIONS:1. Assess impact of patient's symptoms on level of functioning, self care needs and offer support as indicated2. Assess patient/family knowledge of depression, impact on illness and need for teaching3. Provide emotional support, presence and reassurance4. Assess for possible suicidal thoughts or ideation. If patient expresses suicidal thoughts or statements do not leave alone, initiate Suicide Precautions, move to a room close to the nursing station and obtain sitter5. Initiate consults as appropriate with Mental Health Professional, Spiritual Care, Psychosocial CNS, and consider a recommendation to the LIP for a Psychiatric Consultation  5/19/2025 2325 by Cat Starks RN  Outcome: Progressing  5/19/2025 1054 by Klaudia James RN  Outcome: Progressing  Flowsheets (Taken 5/19/2025 1051)  Effect of psychiatric condition will be minimized and patient will be protected from self 
RN  Outcome: Progressing  Flowsheets  Taken 5/18/2025 1058 by Kayleigh Philippe RN  Effect of psychiatric condition will be minimized and patient will be protected from self harm: Provide emotional support, presence and reassurance  Taken 5/18/2025 1054 by Kayleigh Philippe RN  Effect of psychiatric condition will be minimized and patient will be protected from self harm: Provide emotional support, presence and reassurance  Taken 5/18/2025 0020 by Paradise Carpenter RN  Effect of psychiatric condition will be minimized and patient will be protected from self harm:   Assess for suicidal thoughts or ideation. If patient expresses suicidal thoughts or statements do not leave alone, initiate Suicide Precautions, move near nurse station, obtain sitter   Assess patient/family knowledge of depression, impact on illness and need for teaching   Provide emotional support, presence and reassurance     Problem: Risk for Elopement  Goal: Patient will not exit the unit/facility without proper excort  5/18/2025 2349 by Paradise Carpenter RN  Outcome: Progressing  5/18/2025 1058 by Kayleigh Philippe RN  Outcome: Progressing  Flowsheets (Taken 5/18/2025 1054)  Nursing Interventions for Elopement Risk: Communicate to physician the risk for elopement     Problem: Discharge Planning  Goal: Discharge to home or other facility with appropriate resources  5/18/2025 2349 by Paradise Carpenter RN  Outcome: Progressing  5/18/2025 1058 by Kayleigh Philippe RN  Outcome: Progressing  Flowsheets (Taken 5/18/2025 1054)  Discharge to home or other facility with appropriate resources: Identify barriers to discharge with patient and caregiver     Problem: Safety - Adult  Goal: Free from fall injury  5/18/2025 2349 by Paradise Carpenter RN  Outcome: Progressing  5/18/2025 1058 by Kayleigh Philippe RN  Outcome: Progressing     
  Problem: Discharge Planning  Goal: Discharge to home or other facility with appropriate resources  5/18/2025 1058 by Kayleigh Philippe, RN  Outcome: Progressing  Flowsheets (Taken 5/18/2025 1054)  Discharge to home or other facility with appropriate resources: Identify barriers to discharge with patient and caregiver  5/18/2025 0019 by Paradise rehman RN  Outcome: Progressing     Problem: Safety - Adult  Goal: Free from fall injury  Outcome: Progressing

## 2025-05-20 NOTE — DISCHARGE INSTRUCTIONS
Someone from [facility] will be calling you tomorrow to follow up on your care. If you don't hear from us, give us a call! [phone].    Keep all follow up appointments, take medications as ordered, utilize positive supports, abstain from use of alcohol and drugs. If symptoms return or you feel at risk to yourself or others, please call 911, return the nearest emergency room, or call your local crisis hotline:  [facility]: [phone]  [facility]: [phone]  [facility]: [phone]     For assistance with quitting smoking please go to https://smokefree.gov. A prescription for an FDA-approved tobacco cessation medication was offered at discharge and the patient refused.

## 2025-05-20 NOTE — GROUP NOTE
Patient did not attend group / Anticipated discharge today.    Date: 5/20/2025    Group Start Time: 0930  Group End Time: 1030  Group Topic: Psychoeducation    [facility] 3W [facility]    Establishing & Enforcing Boundaries in Interpersonal Relationships  Education, Exploration, Clarifying, Problem-solving    Patients will learn about and discuss setting healthy and reasonable boundaries. Patients will review boundary styles (porous, healthy, and rigid), types of boundaries (physical, emotional, intellectual, sexual, material, and time), and tips on how to set boundaries. Patients will engage in setting boundaries through practicing common scenarios in which boundary setting would be applicable. Patients will reflect on their past boundary setting experience(s), review how to reinforce boundaries when experiencing resistance, and share how they might use these skills in their interpersonal relationships going forward.     Focus: Interpersonal Communication Skills, Personal Values, and Radical Acceptance  Goals: Improve/Maintain Cognitive Skills, Improve/Maintain Insight / Self-Awareness, Increase/Maintain Level of Socialization, Improve/Maintain Self-Expression, and Promote Reality Orientation     Signature: Bella Lizarraga Licensed Professional Music Therapist (LPMT)

## 2025-05-20 NOTE — TRANSITION OF CARE
[place], [place]  [phone]    lets get real  Follow up call for assitance with treatment options or genreal support for sobriety [address] [place], [place]  [phone]             Advanced Directive:   Does the patient have an appointed surrogate decision maker? No  Does the patient have a Medical Advance Directive? No  Does the patient have a Psychiatric Advance Directive? No  If the patient does not have a surrogate or Medical Advance Directive AND Psychiatric Advance Directive, the patient was offered information on these advance directives Patient declined to complete    Patient Instructions: Please continue all medications until otherwise directed by physician.  Please keep all outpatient mental health follow up appointments    Tobacco Cessation Discharge Plan:   Is the patient a tobacco user  and needs referral for tobacco cessation? Yes  Patient referred to the following for tobacco cessation with an appointment? Patient refused Patient provided with cessation phone number and website  Patient was offered medication to assist with tobacco cessation at discharge? Patient refused    Alcohol/Substance Abuse Discharge Plan:   Does the patient have a history of substance/alcohol abuse and requires a referral for treatment? Yes  Patient referred to the following for substance/alcohol abuse treatment with an appointment? Yes [facility]   Patient was offered medication to assist with substance/alcohol abuse cessation at discharge? Patient refused      Patient discharged to: Home; Transition record discussed with patient/caregiver and provided this record in hard copy or electronically

## 2025-05-20 NOTE — FLOWSHEET NOTE
Reviewed discharge instructions with patient. Pt. Verbalized understanding. Denies SI/HI/AVH. Ambulatory off unit.

## 2025-05-20 NOTE — PROGRESS NOTES
Pt reports no depression or anxiety @ this time,denies SI/HI/AVH. Pt does not attend groups, pt did not shower today. Pt reports good appetite, Pt reports good appetite.
